# Patient Record
Sex: MALE | Race: WHITE | NOT HISPANIC OR LATINO | URBAN - METROPOLITAN AREA
[De-identification: names, ages, dates, MRNs, and addresses within clinical notes are randomized per-mention and may not be internally consistent; named-entity substitution may affect disease eponyms.]

---

## 2022-10-20 ENCOUNTER — OFFICE (OUTPATIENT)
Dept: URBAN - METROPOLITAN AREA CLINIC 67 | Facility: CLINIC | Age: 77
End: 2022-10-20

## 2022-10-20 VITALS
SYSTOLIC BLOOD PRESSURE: 130 MMHG | HEIGHT: 69 IN | WEIGHT: 209 LBS | DIASTOLIC BLOOD PRESSURE: 70 MMHG | HEART RATE: 86 BPM

## 2022-10-20 DIAGNOSIS — R12 HEARTBURN: ICD-10-CM

## 2022-10-20 DIAGNOSIS — K59.09 OTHER CONSTIPATION: ICD-10-CM

## 2022-10-20 DIAGNOSIS — R93.41 ABNORMAL RADIOLOGIC FINDINGS ON DIAGNOSTIC IMAGING OF RENAL: ICD-10-CM

## 2022-10-20 PROCEDURE — 99214 OFFICE O/P EST MOD 30 MIN: CPT | Performed by: SPECIALIST

## 2022-10-20 RX ORDER — LINACLOTIDE 290 UG/1
CAPSULE, GELATIN COATED ORAL
Qty: 90 | Refills: 2 | Status: ACTIVE
Start: 2022-10-20

## 2022-10-20 NOTE — SERVICEHPINOTES
Charlie Berkowitz Jr.   is seen today for a follow-up visit.     PT with long standing constipation and takes multiple laxatives daily.  Regular labs Dr Jo Faulkner recommended but he never tool.  Denies melena or hematochezia  .  chronic GERD sleeps with head of bead elevated.  PRN OTC antiacids

## 2025-07-11 NOTE — SERVICENOTES
Perfect.   Pt states he has a urologist....he is a poor historian but I recommend that he f/u with his urologist.  consider colonoscopy but he does not want at this time.